# Patient Record
Sex: MALE | Race: BLACK OR AFRICAN AMERICAN | NOT HISPANIC OR LATINO | URBAN - METROPOLITAN AREA
[De-identification: names, ages, dates, MRNs, and addresses within clinical notes are randomized per-mention and may not be internally consistent; named-entity substitution may affect disease eponyms.]

---

## 2018-01-16 NOTE — PROCEDURES
Results/Data    Procedure: Electromyogram and Nerve Conduction Study  Indication: Right Lower Extremity   Referred by Dr Florina Borja  The procedure's were discussed with the patient  Written consent was obtained prior to the procedure and is detailed in the patient's record  Prior to the start of the procedure a time out was taken and the identity of the patient was confirmed via name and date of birth with the patient  The correct site and the procedure to be performed were confirmed  The correct side was confirmed if applicable  The positioning of the patient was verified  The availability of the correct equipment was verified  Procedure Start Time: 10:20    Technique: A sterile concentric needle electrode was used  The patient tolerated the procedure well  There were no complications  Results  : Motor and sensory nerve conduction studies were performed on the peroneal , tibial and sural nerves  The peroneal and the tibial compound motor action potentials were within normal limits  The peroneal and tibial F wave latencies were within normal limits  The sural sensory action potential was within normal limits  The bilateral H soleus responses were within normal limits  Concentric needle examination was performed on various proximal and distal muscles including gluteus medius, vastus lateralis, tibialis anterior, medial gastrocnemius, EDB, L4-5 and L5-S1 paraspinal myotomes  There was no evidence of active denervation in any of the muscles tested  The compound motor unit action potentials were of normal configuration with interference patterns being full or full for effort  Interpretation: This is a normal study        Signatures   Electronically signed by : Celena Weldon MD; Mar 16 2016 10:49AM EST                       (Author)

## 2021-04-08 DIAGNOSIS — Z23 ENCOUNTER FOR IMMUNIZATION: ICD-10-CM

## 2023-12-28 ENCOUNTER — TELEPHONE (OUTPATIENT)
Age: 73
End: 2023-12-28

## 2023-12-28 NOTE — TELEPHONE ENCOUNTER
Called and left message for patient to call the office back for requested information. There is a recall in the patients chart stating when he is due and it's a 3 year recall so its 3 years before the recall date.

## 2023-12-28 NOTE — TELEPHONE ENCOUNTER
Patients CRS provider:  Dr. Pabon    Number to return call: [phone]    Reason for call: Pt calling to find out the date of his last colonoscopy with  and when he is due for his next one. Unable to reach office at the time of the call, please call directly to notify.     Scheduled procedure/appointment date if applicable: N/A

## 2024-11-12 ENCOUNTER — TELEPHONE (OUTPATIENT)
Age: 74
End: 2024-11-12

## 2024-11-13 ENCOUNTER — APPOINTMENT (EMERGENCY)
Dept: VASCULAR ULTRASOUND | Facility: HOSPITAL | Age: 74
End: 2024-11-13
Payer: MEDICARE

## 2024-11-13 ENCOUNTER — HOSPITAL ENCOUNTER (EMERGENCY)
Facility: HOSPITAL | Age: 74
Discharge: HOME | End: 2024-11-13
Payer: MEDICARE

## 2024-11-13 ENCOUNTER — APPOINTMENT (EMERGENCY)
Dept: RADIOLOGY | Facility: HOSPITAL | Age: 74
End: 2024-11-13
Payer: MEDICARE

## 2024-11-13 VITALS
OXYGEN SATURATION: 99 % | DIASTOLIC BLOOD PRESSURE: 72 MMHG | TEMPERATURE: 97.9 F | SYSTOLIC BLOOD PRESSURE: 156 MMHG | HEART RATE: 70 BPM | BODY MASS INDEX: 29.02 KG/M2 | WEIGHT: 170 LBS | RESPIRATION RATE: 16 BRPM | HEIGHT: 64 IN

## 2024-11-13 DIAGNOSIS — M25.562 LEFT KNEE PAIN: Primary | ICD-10-CM

## 2024-11-13 PROCEDURE — 93971 EXTREMITY STUDY: CPT

## 2024-11-13 PROCEDURE — 73564 X-RAY EXAM KNEE 4 OR MORE: CPT

## 2024-11-13 PROCEDURE — 99284 EMERGENCY DEPT VISIT MOD MDM: CPT

## 2024-11-13 PROCEDURE — 93971 EXTREMITY STUDY: CPT | Performed by: SURGERY

## 2024-11-13 PROCEDURE — 96372 THER/PROPH/DIAG INJ SC/IM: CPT

## 2024-11-13 RX ORDER — LIDOCAINE 50 MG/G
2 PATCH TOPICAL ONCE
Status: DISCONTINUED | OUTPATIENT
Start: 2024-11-13 | End: 2024-11-13 | Stop reason: HOSPADM

## 2024-11-13 RX ORDER — LIDOCAINE 50 MG/G
1 PATCH TOPICAL DAILY
Qty: 30 PATCH | Refills: 0 | Status: SHIPPED | OUTPATIENT
Start: 2024-11-13

## 2024-11-13 RX ORDER — NAPROXEN 500 MG/1
500 TABLET ORAL 2 TIMES DAILY WITH MEALS
Qty: 30 TABLET | Refills: 0 | Status: SHIPPED | OUTPATIENT
Start: 2024-11-13

## 2024-11-13 RX ORDER — KETOROLAC TROMETHAMINE 30 MG/ML
15 INJECTION, SOLUTION INTRAMUSCULAR; INTRAVENOUS ONCE
Status: COMPLETED | OUTPATIENT
Start: 2024-11-13 | End: 2024-11-13

## 2024-11-13 RX ORDER — ACETAMINOPHEN 325 MG/1
975 TABLET ORAL ONCE
Status: COMPLETED | OUTPATIENT
Start: 2024-11-13 | End: 2024-11-13

## 2024-11-13 RX ADMIN — KETOROLAC TROMETHAMINE 15 MG: 30 INJECTION, SOLUTION INTRAMUSCULAR at 17:06

## 2024-11-13 RX ADMIN — ACETAMINOPHEN 975 MG: 325 TABLET, FILM COATED ORAL at 17:06

## 2024-11-13 RX ADMIN — LIDOCAINE 2 PATCH: 700 PATCH TOPICAL at 18:52

## 2024-11-13 NOTE — DISCHARGE INSTRUCTIONS
Please take Naprosyn as prescribed. Use Lidocaine patches and change every 12 hours.    Follow-up with orthopedist as instructed.  Return to the ED with any new or worsening concerns.  Weightbearing as tolerated.

## 2024-11-13 NOTE — ED NOTES
Educated patient regarding medication and crutches use. Pt verbalized understanding.      Sadia Wagner RN  11/13/24 9170

## 2024-11-14 NOTE — ED PROVIDER NOTES
Time reflects when diagnosis was documented in both MDM as applicable and the Disposition within this note       Time User Action Codes Description Comment    11/13/2024  6:25 PM Bismark Pompa Bakari [M25.562] Left knee pain           ED Disposition       ED Disposition   Discharge    Condition   Stable    Date/Time   Wed Nov 13, 2024  6:25 PM    Comment   Checo Duran discharge to home/self care.                   Assessment & Plan       Medical Decision Making  Patient is a 74 year old male presenting to the ED for evaluation of acute pain behind the L knee.Ddx: Bakers cyst, ligament strain vs tear, sciatica, DVT. Repeat Xrs were ordered showing no acute abnormalities. A duplex study was ordered to r/o DVT and cyst, which was negative. Discussed with patient that he will need to follow up with ortho for an MRI, as it may be possible that patient has a ligament tear in his knee. Unable to ambulate secondary to pain therefore an ace warp was applied and patient was instructed on the use of crutches. An ambulatory referral to [facility] ortho was placed. Patient given Rx Naprosyn, Lidocaine patch. He was given Tylenol, Toradol in the ED.     I reviewed all testing with the patient:   I gave oral return precautions for what to return for in addition to the written return precautions.   The patient verbalized understanding of the discharge instructions and warnings that would necessitate return to the Emergency Department.  I specifically highlighted areas of special concern regarding the written and verbal discharge instructions and return precautions.    All questions were answered prior to discharge.      Amount and/or Complexity of Data Reviewed  Radiology: ordered and independent interpretation performed.    Risk  OTC drugs.  Prescription drug management.        ED Course as of 11/13/24 2220   Wed Nov 13, 2024   1746 X-ray negative for acute fractures or dislocations, or effusions.   1824 No DVT per Vascular Tech; No  evidence of Bakers cyst         Medications   acetaminophen (TYLENOL) tablet 975 mg (975 mg Oral Given 11/13/24 1706)   ketorolac (TORADOL) injection 15 mg (15 mg Intramuscular Given 11/13/24 1706)       ED Risk Strat Scores                           SBIRT 20yo+      Flowsheet Row Most Recent Value   Initial Alcohol Screen: US AUDIT-C     1. How often do you have a drink containing alcohol? 0 Filed at: 11/13/2024 1652   2. How many drinks containing alcohol do you have on a typical day you are drinking?  0 Filed at: 11/13/2024 1652   3a. Male UNDER 65: How often do you have five or more drinks on one occasion? 0 Filed at: 11/13/2024 1652   3b. FEMALE Any Age, or MALE 65+: How often do you have 4 or more drinks on one occassion? 0 Filed at: 11/13/2024 1652   Audit-C Score 0 Filed at: 11/13/2024 1652   DECLAN: How many times in the past year have you...    Used an illegal drug or used a prescription medication for non-medical reasons? Never Filed at: 11/13/2024 1652                            History of Present Illness       Chief Complaint   Patient presents with    Knee Pain     BIBA for complains of left knee pain. Pt. Was walking and heard a pop on his left kb=nee. No deformity noted. Pt. Is Currently on treatment for his right knee.       No past medical history on file.   No past surgical history on file.   No family history on file.       No existing history information found.   No existing history information found.   I have reviewed and agree with the history as documented.     Patient is a 74 year old male presenting to the ED for evaluation of pain behind the L knee. States that he first noticed the pain about 2 weeks ago, when he was bending over and felt a strain in the L knee. Since that time, patient has been under the care of an orthopedist, who performed Xrs that were negative and has been working with the patient to address his pain. Today, patient had a follow up appointment with ortho and was  prescribed Baclofen. Upon leaving the office today, patient states that he was walking to his car when he experienced an acute sharp pain behind the L knee, causing the knee to buckle. Patient did not fall, but since that time he has been unable to put any weight on the L knee. States he experiences sharp pain and pressure behind the L knee with attempted ambulation. Denies any acute trauma. States that he might have heard a pop at the onset of pain. Denies fevers, chills, cough or congestion, chest pain, dyspnea, abdominal pain, paresthesias in the L leg, previous hx of DVT.        Review of Systems   All other systems reviewed and are negative.          Objective       ED Triage Vitals [11/13/24 1650]   Temperature Pulse Blood Pressure Respirations SpO2 Patient Position - Orthostatic VS   97.9 °F (36.6 °C) 70 156/72 16 99 % Sitting      Temp Source Heart Rate Source BP Location FiO2 (%) Pain Score    Oral Monitor Left arm -- 3      Vitals      Date and Time Temp Pulse SpO2 Resp BP Pain Score FACES Pain Rating User   11/13/24 1706 -- -- -- -- -- 3 -- SR   11/13/24 1650 97.9 °F (36.6 °C) 70 99 % 16 156/72 3 -- LM            Physical Exam  Vitals and nursing note reviewed.   Constitutional:       General: He is not in acute distress.     Appearance: Normal appearance. He is well-developed and normal weight. He is not toxic-appearing.   HENT:      Head: Normocephalic and atraumatic.      Right Ear: External ear normal.      Left Ear: External ear normal.      Nose: Nose normal.      Mouth/Throat:      Mouth: Mucous membranes are moist.   Eyes:      Conjunctiva/sclera: Conjunctivae normal.   Cardiovascular:      Rate and Rhythm: Normal rate and regular rhythm.      Pulses: Normal pulses.      Heart sounds: Normal heart sounds. No murmur heard.  Pulmonary:      Effort: Pulmonary effort is normal. No respiratory distress.      Breath sounds: Normal breath sounds. No wheezing, rhonchi or rales.   Abdominal:      General:  Abdomen is flat.      Palpations: Abdomen is soft.      Tenderness: There is no abdominal tenderness.   Musculoskeletal:         General: No swelling.      Cervical back: Normal, normal range of motion and neck supple.      Thoracic back: Normal.      Lumbar back: Normal. No swelling, edema, spasms, tenderness or bony tenderness. Normal range of motion. Negative right straight leg raise test and negative left straight leg raise test.      Right knee: Normal.      Left knee: No swelling, deformity, effusion, erythema, ecchymosis, lacerations, bony tenderness or crepitus. Decreased range of motion (in flexion secondary to pain in the popliteal space). Tenderness (popliteal space) present. No medial joint line, lateral joint line, MCL, LCL, ACL, PCL or patellar tendon tenderness. No LCL laxity or MCL laxity.Normal pulse.      Instability Tests: Anterior drawer test negative. Posterior drawer test negative.      Right lower leg: Normal.      Left lower leg: Tenderness (at the proximal medial gastroc) present. No swelling or bony tenderness. No edema.      Right ankle: Normal.      Left ankle: Normal.      Right foot: Normal.      Left foot: Normal. Normal pulse.   Skin:     General: Skin is warm and dry.      Capillary Refill: Capillary refill takes less than 2 seconds.      Coloration: Skin is not pale.      Findings: No erythema.   Neurological:      General: No focal deficit present.      Mental Status: He is alert and oriented to person, place, and time.      Sensory: No sensory deficit.      Motor: No weakness.   Psychiatric:         Mood and Affect: Mood normal.         Results Reviewed       None            VAS lower limb venous duplex study, unilateral/limited   Final Interpretation by Jaleel Vásquez MD (11/13 2260)      XR knee 4+ views left injury   ED Interpretation by Bismark Pompa DO (11/13 9456)   No acute fracture or dislocation as interpreted by me            Procedures    ED Medication and  Procedure Management   None     Discharge Medication List as of 11/13/2024  6:27 PM        START taking these medications    Details   lidocaine (Lidoderm) 5 % Apply 1 patch topically over 12 hours daily Remove & Discard patch within 12 hours or as directed by MD, Starting Wed 11/13/2024, Normal      naproxen (Naprosyn) 500 mg tablet Take 1 tablet (500 mg total) by mouth 2 (two) times a day with meals, Starting Wed 11/13/2024, Normal             ED SEPSIS DOCUMENTATION   Time reflects when diagnosis was documented in both MDM as applicable and the Disposition within this note       Time User Action Codes Description Comment    11/13/2024  6:25 PM Bismark Pompa Add [M25.562] Left knee pain                  Bismark Pompa, DO  11/13/24 8264

## 2024-12-04 ENCOUNTER — TELEPHONE (OUTPATIENT)
Age: 74
End: 2024-12-04

## 2024-12-04 NOTE — TELEPHONE ENCOUNTER
12/04/24  Screened by: Hanny Arellano    Referring Provider 3 yr recall     Pre- Screening:     BMI 29.18   Has patient been referred for a routine screening Colonoscopy? yes  Is the patient between 45-75 years old? yes      Previous Colonoscopy yes   If yes:    Date: 2021     Facility: [facility]     Reason:       Does the patient want to see a Gastroenterologist prior to their procedure OR are they having any GI symptoms? no    Has the patient been hospitalized or had abdominal surgery in the past 6 months? no    Does the patient use supplemental oxygen? no    Does the patient take Coumadin, Lovenox, Plavix, Elliquis, Xarelto, or other blood thinning medication? no    Has the patient had a stroke, cardiac event, or stent placed in the past year? no      If patient is between 45yrs - 49yrs, please advise patient that we will have to confirm benefits & coverage with their insurance company for a routine screening colonoscopy.

## 2024-12-04 NOTE — TELEPHONE ENCOUNTER
Scheduled date of colonoscopy (as of today):  Physician performing colonoscopy:   Location of colonoscopy: [facility]   Bowel prep reviewed with patient: LIVE/SUPRIYA    Instructions reviewed with patient by: ROLAND   Clearances: NONE       PATIENT WANTED CONSULT [facility] 12/13

## 2024-12-13 ENCOUNTER — CONSULT (OUTPATIENT)
Age: 74
End: 2024-12-13
Payer: MEDICARE

## 2024-12-13 VITALS
BODY MASS INDEX: 29.02 KG/M2 | SYSTOLIC BLOOD PRESSURE: 133 MMHG | OXYGEN SATURATION: 96 % | DIASTOLIC BLOOD PRESSURE: 82 MMHG | HEART RATE: 75 BPM | WEIGHT: 170 LBS | HEIGHT: 64 IN

## 2024-12-13 DIAGNOSIS — Z12.11 SCREENING FOR COLON CANCER: Primary | ICD-10-CM

## 2024-12-13 DIAGNOSIS — E66.3 PATIENT OVERWEIGHT: ICD-10-CM

## 2024-12-13 DIAGNOSIS — M86.361 CHRONIC MULTIFOCAL OSTEOMYELITIS, RIGHT TIBIA AND FIBULA (HCC): ICD-10-CM

## 2024-12-13 DIAGNOSIS — Z85.038 HX OF COLON CANCER, STAGE III: Primary | ICD-10-CM

## 2024-12-13 PROCEDURE — 99203 OFFICE O/P NEW LOW 30 MIN: CPT | Performed by: COLON & RECTAL SURGERY

## 2024-12-13 RX ORDER — SODIUM CHLORIDE, SODIUM LACTATE, POTASSIUM CHLORIDE, CALCIUM CHLORIDE 600; 310; 30; 20 MG/100ML; MG/100ML; MG/100ML; MG/100ML
125 INJECTION, SOLUTION INTRAVENOUS CONTINUOUS
OUTPATIENT
Start: 2024-12-13

## 2024-12-13 NOTE — PROGRESS NOTES
"Name: Checo Duran      : 1950      MRN: [id number]  Encounter Provider: Yamil Pabon MD  Encounter Date: 2024   Encounter department: [facility] COLON AND RECTAL SURGERY [place]  :  Assessment & Plan  Hx of colon cancer, stage III    Orders:    Colonoscopy; Future    Chronic multifocal osteomyelitis, right tibia and fibula (HCC)         Patient overweight             History of Present Illness   HPI  Checo Duran is a 74 y.o. male who presents status post sigmoid colectomy 2004 stage III colon cancer.  With history of recurrent postoperative polyps.  Last colonoscopy 3 years ago.  Patient presents for screening colonoscopy  History obtained from: patient    Review of Systems   Constitutional:  Negative for chills and fever.   HENT:  Negative for ear pain and sore throat.    Eyes:  Negative for pain and visual disturbance.   Respiratory:  Negative for cough and shortness of breath.    Cardiovascular:  Negative for chest pain and palpitations.   Gastrointestinal:  Negative for abdominal pain and vomiting.   Genitourinary:  Negative for dysuria and hematuria.   Musculoskeletal:  Negative for arthralgias and back pain.   Skin:  Negative for color change and rash.   Neurological:  Negative for seizures and syncope.   All other systems reviewed and are negative.  Patient with recurrent osteomyelitis right tibia  Medical History Reviewed by provider this encounter:  Tobacco  Allergies  Meds  Problems  Med Hx  Surg Hx  Fam Hx     .     Objective   /82   Pulse 75   Ht 5' 4\" (1.626 m)   Wt 77.1 kg (170 lb)   SpO2 96%   BMI 29.18 kg/m²      Physical Exam  Vitals and nursing note reviewed.   Constitutional:       General: He is not in acute distress.     Appearance: He is well-developed.   HENT:      Head: Normocephalic and atraumatic.   Eyes:      Conjunctiva/sclera: Conjunctivae normal.   Cardiovascular:      Rate and Rhythm: Normal rate and regular rhythm.      Heart " sounds: No murmur heard.  Pulmonary:      Effort: Pulmonary effort is normal. No respiratory distress.      Breath sounds: Normal breath sounds.   Abdominal:      Palpations: Abdomen is soft.      Tenderness: There is no abdominal tenderness.   Musculoskeletal:         General: No swelling.      Cervical back: Neck supple.   Skin:     General: Skin is warm and dry.      Capillary Refill: Capillary refill takes less than 2 seconds.      Findings: Lesion present.      Comments: Open skin ulceration right mid tibia secondary to osteomyelitis 3 cm's by 4 cm's   Neurological:      Mental Status: He is alert.   Psychiatric:         Mood and Affect: Mood normal.

## 2024-12-13 NOTE — PATIENT INSTRUCTIONS
Scheduled date of colonoscopy (as of today): 12/30/24  Physician performing colonoscopy: Cm  Location of colonoscopy: [facility]  Bowel prep reviewed with patient: Golytely  Instructions reviewed with patient by: Hetal COSTELLO  Clearances:

## 2024-12-13 NOTE — H&P (VIEW-ONLY)
"Name: Checo Duran      : 1950      MRN: [id number]  Encounter Provider: Yamil Pabon MD  Encounter Date: 2024   Encounter department: [facility] COLON AND RECTAL SURGERY [place]  :  Assessment & Plan  Hx of colon cancer, stage III    Orders:    Colonoscopy; Future    Chronic multifocal osteomyelitis, right tibia and fibula (HCC)         Patient overweight             History of Present Illness   HPI  Checo Duran is a 74 y.o. male who presents status post sigmoid colectomy 2004 stage III colon cancer.  With history of recurrent postoperative polyps.  Last colonoscopy 3 years ago.  Patient presents for screening colonoscopy  History obtained from: patient    Review of Systems   Constitutional:  Negative for chills and fever.   HENT:  Negative for ear pain and sore throat.    Eyes:  Negative for pain and visual disturbance.   Respiratory:  Negative for cough and shortness of breath.    Cardiovascular:  Negative for chest pain and palpitations.   Gastrointestinal:  Negative for abdominal pain and vomiting.   Genitourinary:  Negative for dysuria and hematuria.   Musculoskeletal:  Negative for arthralgias and back pain.   Skin:  Negative for color change and rash.   Neurological:  Negative for seizures and syncope.   All other systems reviewed and are negative.  Patient with recurrent osteomyelitis right tibia  Medical History Reviewed by provider this encounter:  Tobacco  Allergies  Meds  Problems  Med Hx  Surg Hx  Fam Hx     .     Objective   /82   Pulse 75   Ht 5' 4\" (1.626 m)   Wt 77.1 kg (170 lb)   SpO2 96%   BMI 29.18 kg/m²      Physical Exam  Vitals and nursing note reviewed.   Constitutional:       General: He is not in acute distress.     Appearance: He is well-developed.   HENT:      Head: Normocephalic and atraumatic.   Eyes:      Conjunctiva/sclera: Conjunctivae normal.   Cardiovascular:      Rate and Rhythm: Normal rate and regular rhythm.      Heart " sounds: No murmur heard.  Pulmonary:      Effort: Pulmonary effort is normal. No respiratory distress.      Breath sounds: Normal breath sounds.   Abdominal:      Palpations: Abdomen is soft.      Tenderness: There is no abdominal tenderness.   Musculoskeletal:         General: No swelling.      Cervical back: Neck supple.   Skin:     General: Skin is warm and dry.      Capillary Refill: Capillary refill takes less than 2 seconds.      Findings: Lesion present.      Comments: Open skin ulceration right mid tibia secondary to osteomyelitis 3 cm's by 4 cm's   Neurological:      Mental Status: He is alert.   Psychiatric:         Mood and Affect: Mood normal.

## 2024-12-30 ENCOUNTER — HOSPITAL ENCOUNTER (OUTPATIENT)
Dept: GASTROENTEROLOGY | Facility: HOSPITAL | Age: 74
Setting detail: OUTPATIENT SURGERY
Discharge: HOME | End: 2024-12-30
Attending: COLON & RECTAL SURGERY
Payer: MEDICARE

## 2024-12-30 ENCOUNTER — ANESTHESIA EVENT (OUTPATIENT)
Dept: GASTROENTEROLOGY | Facility: HOSPITAL | Age: 74
End: 2024-12-30
Payer: MEDICARE

## 2024-12-30 ENCOUNTER — ANESTHESIA (OUTPATIENT)
Dept: GASTROENTEROLOGY | Facility: HOSPITAL | Age: 74
End: 2024-12-30
Payer: MEDICARE

## 2024-12-30 VITALS
DIASTOLIC BLOOD PRESSURE: 58 MMHG | RESPIRATION RATE: 18 BRPM | WEIGHT: 176.59 LBS | OXYGEN SATURATION: 98 % | HEART RATE: 70 BPM | HEIGHT: 64 IN | BODY MASS INDEX: 30.15 KG/M2 | TEMPERATURE: 97.5 F | SYSTOLIC BLOOD PRESSURE: 119 MMHG

## 2024-12-30 DIAGNOSIS — Z85.038 HX OF COLON CANCER, STAGE III: ICD-10-CM

## 2024-12-30 PROBLEM — I63.9 CVA (CEREBRAL VASCULAR ACCIDENT) (HCC): Status: RESOLVED | Noted: 2024-12-30 | Resolved: 2024-12-30

## 2024-12-30 PROBLEM — I63.9 CVA (CEREBRAL VASCULAR ACCIDENT) (HCC): Status: ACTIVE | Noted: 2024-12-30

## 2024-12-30 PROCEDURE — 88305 TISSUE EXAM BY PATHOLOGIST: CPT | Performed by: PATHOLOGY

## 2024-12-30 PROCEDURE — 45385 COLONOSCOPY W/LESION REMOVAL: CPT | Performed by: COLON & RECTAL SURGERY

## 2024-12-30 RX ORDER — LIDOCAINE HYDROCHLORIDE 10 MG/ML
INJECTION, SOLUTION EPIDURAL; INFILTRATION; INTRACAUDAL; PERINEURAL AS NEEDED
Status: DISCONTINUED | OUTPATIENT
Start: 2024-12-30 | End: 2024-12-30

## 2024-12-30 RX ORDER — SODIUM CHLORIDE, SODIUM LACTATE, POTASSIUM CHLORIDE, CALCIUM CHLORIDE 600; 310; 30; 20 MG/100ML; MG/100ML; MG/100ML; MG/100ML
125 INJECTION, SOLUTION INTRAVENOUS CONTINUOUS
Status: DISCONTINUED | OUTPATIENT
Start: 2024-12-30 | End: 2024-12-30

## 2024-12-30 RX ORDER — PROPOFOL 10 MG/ML
INJECTION, EMULSION INTRAVENOUS AS NEEDED
Status: DISCONTINUED | OUTPATIENT
Start: 2024-12-30 | End: 2024-12-30

## 2024-12-30 RX ADMIN — SODIUM CHLORIDE, SODIUM LACTATE, POTASSIUM CHLORIDE, AND CALCIUM CHLORIDE: .6; .31; .03; .02 INJECTION, SOLUTION INTRAVENOUS at 08:04

## 2024-12-30 RX ADMIN — SODIUM CHLORIDE, SODIUM LACTATE, POTASSIUM CHLORIDE, AND CALCIUM CHLORIDE 125 ML/HR: .6; .31; .03; .02 INJECTION, SOLUTION INTRAVENOUS at 07:10

## 2024-12-30 RX ADMIN — PROPOFOL 20 MG: 10 INJECTION, EMULSION INTRAVENOUS at 08:33

## 2024-12-30 RX ADMIN — PROPOFOL 80 MG: 10 INJECTION, EMULSION INTRAVENOUS at 08:25

## 2024-12-30 RX ADMIN — LIDOCAINE HYDROCHLORIDE 50 MG: 10 INJECTION, SOLUTION EPIDURAL; INFILTRATION; INTRACAUDAL; PERINEURAL at 08:21

## 2024-12-30 RX ADMIN — PROPOFOL 20 MG: 10 INJECTION, EMULSION INTRAVENOUS at 08:37

## 2024-12-30 RX ADMIN — PROPOFOL 20 MG: 10 INJECTION, EMULSION INTRAVENOUS at 08:41

## 2024-12-30 RX ADMIN — PROPOFOL 40 MG: 10 INJECTION, EMULSION INTRAVENOUS at 08:28

## 2024-12-30 NOTE — ANESTHESIA PREPROCEDURE EVALUATION
Procedure:  COLONOSCOPY    Relevant Problems   ANESTHESIA (within normal limits)      CARDIO (within normal limits)   (-) Chest pain   (-) SOLOMON (dyspnea on exertion)   (-) MI (myocardial infarction) (HCC)      ENDO (within normal limits)      GI/HEPATIC (within normal limits)  NPO confirmed  BMI 30.3      /RENAL (within normal limits)      HEMATOLOGY (within normal limits)      NEURO/PSYCH   (-) CVA (cerebral vascular accident) (HCC)      PULMONARY (within normal limits)   (-) Sleep apnea   (-) URI (upper respiratory infection)     *no blood products, ok with albumin     Allergies   Allergen Reactions    Pollen Extract Cough    Chocolate - Food Allergy Hives, Itching and Rash     Social History     Tobacco Use    Smoking status: Never    Smokeless tobacco: Never   Substance Use Topics    Alcohol use: Not Currently    Drug use: Never     Current Outpatient Medications   Medication Instructions    cephalexin (KEFLEX) 500 mg, 2 times daily    polyethylene glycol (GOLYTELY) 4000 mL solution 4,000 mL, Oral, Once     Lab Results   Component Value Date    SODIUM 140 10/28/2024    K 4.6 10/28/2024     10/28/2024    CO2 29 10/28/2024    BUN 11 10/28/2024    CREATININE 1.03 10/28/2024    GLUC 106 (H) 10/28/2024    HGBA1C 6.3 (H) 10/28/2024    AST 23 10/28/2024    ALT 10 10/28/2024    ALKPHOS 84 10/28/2024    TBILI 1.1 (H) 10/28/2024    ALB 4.1 10/28/2024    INR 1.0 11/26/2018     Vitals:    12/30/24 0702   BP: 125/75   Pulse: 90   Resp: 16   Temp: (!) 97.2 °F (36.2 °C)   SpO2: 98%       Physical Exam    Airway    Mallampati score: II  TM Distance: >3 FB  Neck ROM: full     Dental   Comment: Denies loose/chipped teeth, No notable dental hx     Cardiovascular  Rhythm: regular, Rate: normal, Cardiovascular exam normal    Pulmonary  Pulmonary exam normal Breath sounds clear to auscultation    Other Findings        Anesthesia Plan  ASA Score- 2     Anesthesia Type- IV sedation with anesthesia with ASA Monitors.          Additional Monitors:     Airway Plan:     Comment: O2 mask, natural airway, EtCO2 monitor. Risks discussed including awareness, aspiration, drug reactions and conversion to GA..       Plan Factors-Exercise tolerance (METS): >4 METS.    Chart reviewed.   Existing labs reviewed. Patient summary reviewed.    Patient is not a current smoker.              Induction- intravenous.    Postoperative Plan-     Perioperative Resuscitation Plan - Level 1 - Full Code.       Informed Consent- Anesthetic plan and risks discussed with patient.  I personally reviewed this patient with the CRNA. Discussed and agreed on the Anesthesia Plan with the CRNA..

## 2024-12-30 NOTE — INTERVAL H&P NOTE
H&P reviewed. After examining the patient I find no changes in the patients condition since the H&P had been written.    Vitals:    12/30/24 0702   BP: 125/75   Pulse: 90   Resp: 16   Temp: (!) 97.2 °F (36.2 °C)   SpO2: 98%

## 2024-12-30 NOTE — ANESTHESIA POSTPROCEDURE EVALUATION
Post-Op Assessment Note    CV Status:  Stable    Pain management: adequate       Mental Status:  Alert and awake   Hydration Status:  Euvolemic   PONV Controlled:  Controlled   Airway Patency:  Patent     Post Op Vitals Reviewed: Yes    No anethesia notable event occurred.    Staff: Anesthesiologist, CRNA           Last Filed PACU Vitals:  Vitals Value Taken Time   Temp 97.5 °F (36.4 °C) 12/30/24 0849   Pulse 70 12/30/24 0910   /58 12/30/24 0910   Resp 18 12/30/24 0910   SpO2 98 % 12/30/24 0910       Modified Genaro:  Activity: 2 (12/30/2024  9:10 AM)  Respiration: 2 (12/30/2024  9:10 AM)  Circulation: 2 (12/30/2024  9:10 AM)  Consciousness: 2 (12/30/2024  9:10 AM)  Oxygen Saturation: 2 (12/30/2024  9:10 AM)  Modified Genaro Score: 10 (12/30/2024  9:10 AM)

## 2025-01-02 PROCEDURE — 88305 TISSUE EXAM BY PATHOLOGIST: CPT | Performed by: PATHOLOGY
